# Patient Record
Sex: FEMALE
[De-identification: names, ages, dates, MRNs, and addresses within clinical notes are randomized per-mention and may not be internally consistent; named-entity substitution may affect disease eponyms.]

---

## 2022-10-04 PROBLEM — Z00.00 ENCOUNTER FOR PREVENTIVE HEALTH EXAMINATION: Status: ACTIVE | Noted: 2022-10-04

## 2022-10-07 ENCOUNTER — APPOINTMENT (OUTPATIENT)
Dept: ENDOCRINOLOGY | Facility: CLINIC | Age: 32
End: 2022-10-07

## 2022-10-12 ENCOUNTER — APPOINTMENT (OUTPATIENT)
Dept: ENDOCRINOLOGY | Facility: CLINIC | Age: 32
End: 2022-10-12

## 2022-10-12 VITALS
DIASTOLIC BLOOD PRESSURE: 89 MMHG | BODY MASS INDEX: 26.68 KG/M2 | SYSTOLIC BLOOD PRESSURE: 129 MMHG | WEIGHT: 145 LBS | HEART RATE: 58 BPM | HEIGHT: 62 IN

## 2022-10-12 DIAGNOSIS — Z87.442 PERSONAL HISTORY OF URINARY CALCULI: ICD-10-CM

## 2022-10-12 DIAGNOSIS — R73.02 IMPAIRED GLUCOSE TOLERANCE (ORAL): ICD-10-CM

## 2022-10-14 NOTE — END OF VISIT
[] : Fellow [FreeTextEntry3] : 31yoF h/o kidney stones, gestational DM dx at 7 months gestation treated with lifestyle modification, delivered on 6/21/22. Postpartum fasting FSGs are in the low 100s. A1c 5.1%. Not currently breastfeeding.  Sees a nutritionist. She reports a low carb diet. Uses the elliptical 3-5x a week. Prepregnancy weight was 146 lbs and current weight is 145lbs.  Denies family h/o DM. On exam this is a well-appearing  woman. \par \par The impaired fasting glucose may be related to Shanika phenomenon. Will assess insulin reserves. Counseled on total carb intake <110g per day and at least 150 min of mod to high intensity exercise in total a week.

## 2022-10-14 NOTE — HISTORY OF PRESENT ILLNESS
[FreeTextEntry1] : Nory Darling is a 31-years-old female with a history of kidney stones and recent pregnancy (complicated by gestational diabetes, managed with diet and lifestyle modification, delivered on June 21st) who presented today to establish care due to impaired glucose tolerance. Patient says that she has been checking her fingerstick glucose after delivery and usually gets ~105 mg/dL while fasting or 110-120's one hour after meals. She denies having family history of diabetes. Her last hemoglobin A1C from september was 5.1%. She works in [employer]. Patient drinks alcohol occasionally, denies smoking or recreational drug use.

## 2022-10-14 NOTE — ASSESSMENT
S/W pt and given results  States he is flying out to [place] on 8/22/19, so he would like to know a plan of care  States he cannot fly in the condition that he is in  Schedule earlier OV or Injection?   Please advise [FreeTextEntry1] : Ms. Darling is a 31-years-old female with history of recent pregnancy complicated by gestational diabetes (managed with diet and lifestyle modification, delivery of healthy baby on June 21st) who presents to clinic to establish care due to impaired glucose tolerance. She reports having slightly elevated fasting blood glucose of ~105 mg/dL with normal post-prandial fingersticks. Hemoglobin A1C is within normal limits (5.1%). Will repeat fasting blood glucose with A1C. In addition, will check c-peptide to evaluate insulin reserves given no prior family history of diabetes. Diet and lifestyle modifications discussed with patient. \par \par # Impaired glucose tolerance\par - Check A1C, BMP and c-peptide level.\par - Diet and lifestyle modification.

## 2022-10-14 NOTE — PHYSICAL EXAM
[Alert] : alert [Well Nourished] : well nourished [No Acute Distress] : no acute distress [Well Developed] : well developed [Normal Sclera/Conjunctiva] : normal sclera/conjunctiva [No Proptosis] : no proptosis [Thyroid Not Enlarged] : the thyroid was not enlarged [No Thyroid Nodules] : no palpable thyroid nodules [No Respiratory Distress] : no respiratory distress [No Accessory Muscle Use] : no accessory muscle use [Clear to Auscultation] : lungs were clear to auscultation bilaterally [Normal S1, S2] : normal S1 and S2 [Normal Rate] : heart rate was normal [Regular Rhythm] : with a regular rhythm [No Edema] : no peripheral edema [Normal Bowel Sounds] : normal bowel sounds [Not Tender] : non-tender [Not Distended] : not distended [Soft] : abdomen soft [No Stigmata of Cushings Syndrome] : no stigmata of Cushings Syndrome [Normal Gait] : normal gait [Normal Strength/Tone] : muscle strength and tone were normal [No Rash] : no rash [Normal Reflexes] : deep tendon reflexes were 2+ and symmetric [No Tremors] : no tremors [Oriented x3] : oriented to person, place, and time [Acanthosis Nigricans] : no acanthosis nigricans

## 2022-10-21 ENCOUNTER — NON-APPOINTMENT (OUTPATIENT)
Age: 32
End: 2022-10-21

## 2022-10-26 ENCOUNTER — NON-APPOINTMENT (OUTPATIENT)
Age: 32
End: 2022-10-26

## 2022-11-01 ENCOUNTER — NON-APPOINTMENT (OUTPATIENT)
Age: 32
End: 2022-11-01